# Patient Record
Sex: MALE | Race: WHITE
[De-identification: names, ages, dates, MRNs, and addresses within clinical notes are randomized per-mention and may not be internally consistent; named-entity substitution may affect disease eponyms.]

---

## 2017-03-10 ENCOUNTER — HOSPITAL ENCOUNTER (OUTPATIENT)
Dept: HOSPITAL 53 - M OPP | Age: 27
End: 2017-03-10
Attending: INTERNAL MEDICINE
Payer: OTHER GOVERNMENT

## 2017-03-10 VITALS — DIASTOLIC BLOOD PRESSURE: 75 MMHG | SYSTOLIC BLOOD PRESSURE: 116 MMHG

## 2017-03-10 VITALS — WEIGHT: 180 LBS | HEIGHT: 71 IN | BODY MASS INDEX: 25.2 KG/M2

## 2017-03-10 DIAGNOSIS — K62.89: ICD-10-CM

## 2017-03-10 DIAGNOSIS — K64.8: ICD-10-CM

## 2017-03-10 DIAGNOSIS — K92.1: Primary | ICD-10-CM

## 2017-03-10 DIAGNOSIS — K64.4: ICD-10-CM

## 2017-03-10 DIAGNOSIS — K59.00: ICD-10-CM

## 2017-03-10 NOTE — ROOR
________________________________________________________________________________

Patient Name: Ghulam Hodge          Procedure Date: 3/10/2017 1:48 PM

MRN: [id number]                          Account Number: [id number]

YOB: 1990               Age: 27

Room: [facility]                            Gender: Male

Note Status: Finalized                 

________________________________________________________________________________

 

Procedure:           Colonoscopy

Indications:         Hematochezia, Constipation, Rectal pain

Providers:           El PHAN MD

Referring MD:        RAMSEY NEW MD

Requesting Provider: 

Medicines:           Monitored Anesthesia Care

Complications:       No immediate complications.

________________________________________________________________________________

Procedure:           Pre-Anesthesia Assessment:

                     - The heart rate, respiratory rate, oxygen saturations, 

                     blood pressure, adequacy of pulmonary ventilation, and 

                     response to care were monitored throughout the procedure.

                     The Colonoscope was introduced through the anus and 

                     advanced to the terminal ileum, with identification of 

                     the appendiceal orifice and IC valve. The colonoscopy was 

                     performed without difficulty. The patient tolerated the 

                     procedure well. The quality of the bowel preparation was 

                     good.

                                                                                

Findings:

     The perianal exam findings include non-thrombosed external hemorrhoids.

     Non-bleeding internal hemorrhoids were found during retroflexion. The 

     hemorrhoids were small.

     The entire examined colon appeared normal on direct and retroflexion 

     views.

     The terminal ileum appeared normal.

                                                                                

Impression:          - Moderate Non-thrombosed external hemorrhoids found on 

                     perianal exam.

                     - Small Non-bleeding internal hemorrhoids.

                     - The entire examined colon is normal on direct and 

                     retroflexion views.

                     - The examined portion of the ileum was normal.

                     - No specimens collected.

Recommendation:      - The Internal ("painless") Hemorrhoids are not severe 

                     and do not require banding.

                     - The external ("painful") Hemorrhoids are moderate. 

                     Formal surgery may be considered.

                     - Start Miralax 17 gm twice a day.

                     - Refer to a surgeon at appointment to be scheduled.

                                                                                

 

El Phan MD

________________

El PHAN MD

3/10/2017 2:12:47 PM

This report has been signed electronically.

Number of Addenda: 0

 

Note Initiated On: 3/10/2017 1:48 PM

Estimated Blood Loss:

     Estimated blood loss: none.

## 2019-04-26 ENCOUNTER — HOSPITAL ENCOUNTER (EMERGENCY)
Dept: HOSPITAL 53 - M ED | Age: 29
Discharge: HOME | End: 2019-04-26
Payer: COMMERCIAL

## 2019-04-26 VITALS — SYSTOLIC BLOOD PRESSURE: 101 MMHG | DIASTOLIC BLOOD PRESSURE: 59 MMHG

## 2019-04-26 VITALS — BODY MASS INDEX: 28.76 KG/M2 | WEIGHT: 205.43 LBS | HEIGHT: 71 IN

## 2019-04-26 DIAGNOSIS — J02.9: Primary | ICD-10-CM

## 2019-04-26 DIAGNOSIS — Z87.891: ICD-10-CM

## 2019-04-26 LAB
FLUAV RNA UPPER RESP QL NAA+PROBE: NEGATIVE
FLUBV RNA UPPER RESP QL NAA+PROBE: NEGATIVE